# Patient Record
Sex: MALE | Race: WHITE | NOT HISPANIC OR LATINO | Employment: OTHER | URBAN - NONMETROPOLITAN AREA
[De-identification: names, ages, dates, MRNs, and addresses within clinical notes are randomized per-mention and may not be internally consistent; named-entity substitution may affect disease eponyms.]

---

## 2017-02-02 ENCOUNTER — OFFICE VISIT (OUTPATIENT)
Dept: UROLOGY | Facility: CLINIC | Age: 82
End: 2017-02-02

## 2017-02-02 VITALS
BODY MASS INDEX: 27.63 KG/M2 | WEIGHT: 193 LBS | HEIGHT: 70 IN | DIASTOLIC BLOOD PRESSURE: 80 MMHG | SYSTOLIC BLOOD PRESSURE: 134 MMHG | TEMPERATURE: 97.5 F

## 2017-02-02 DIAGNOSIS — C61 PROSTATE CANCER (HCC): Primary | ICD-10-CM

## 2017-02-02 PROCEDURE — 99214 OFFICE O/P EST MOD 30 MIN: CPT | Performed by: UROLOGY

## 2017-02-02 RX ORDER — CARVEDILOL 12.5 MG/1
12.5 TABLET ORAL 2 TIMES DAILY WITH MEALS
COMMUNITY

## 2017-02-02 RX ORDER — ASPIRIN 81 MG/1
81 TABLET ORAL DAILY
COMMUNITY

## 2017-02-02 NOTE — PROGRESS NOTES
Mr. Quinn is 81 y.o. male    CHIEF COMPLAINT: I have prostate cancer    HPI  Prostate cancer   He was initially diagnosed with prostate cancer about 2 year(s) ago. This was identified in the context of elevated psa.  Severity of the disease is best described as intermediate risk disease. Previous or current management includes active surveillance  including a first surveillance biopsy in July 2016 which showed cancer in 1 of 12 cores.  This was in the left apex at 10%.. Associated symptoms include nocturia, urgency and poor urinary stream. Currently his PSA is  11.95 ng/ml.     He had a CBC and a CMP which are essentially normal.  His PSA measures 11.95 ng/mL.  Previous biopsy in January 2015 showed Reading 3+4 disease in 1 of 12 cores and 20% of that core.  His prebiopsy PSA was 4.7  The following portions of the patient's history were reviewed and updated as appropriate: allergies, current medications, past family history, past medical history, past social history, past surgical history and problem list.    Review of Systems   Constitutional: Negative for chills and fever.   Gastrointestinal: Negative for abdominal pain, anal bleeding and blood in stool.   Genitourinary: Negative for flank pain and hematuria.         Current Outpatient Prescriptions:   •  aspirin 81 MG EC tablet, Take 81 mg by mouth Daily., Disp: , Rfl:   •  carvedilol (COREG) 12.5 MG tablet, Take 12.5 mg by mouth 2 (Two) Times a Day With Meals., Disp: , Rfl:     Past Medical History   Diagnosis Date   • Hypertension        Past Surgical History   Procedure Laterality Date   • Back surgery         Social History     Social History   • Marital status:      Spouse name: N/A   • Number of children: N/A   • Years of education: N/A     Social History Main Topics   • Smoking status: Never Smoker   • Smokeless tobacco: None   • Alcohol use None   • Drug use: None   • Sexual activity: Not Asked     Other Topics Concern   • None     Social History  "Narrative   • None       Family History   Problem Relation Age of Onset   • No Known Problems Father    • No Known Problems Mother        Visit Vitals   • /80   • Temp 97.5 °F (36.4 °C)   • Ht 70\" (177.8 cm)   • Wt 193 lb (87.5 kg)   • BMI 27.69 kg/m2       Physical Exam   Constitutional: He is oriented to person, place, and time. He appears well-developed and well-nourished.   Pulmonary/Chest: Effort normal.   Abdominal: Soft. He exhibits no distension and no mass. There is no tenderness. There is no rebound and no guarding. No hernia.   Genitourinary: Penis normal. Rectal exam shows no mass, no tenderness and anal tone normal. Enlarged: for the age of the patient. Right testis shows no mass, no swelling and no tenderness. Left testis shows no mass, no swelling and no tenderness. No hypospadias. No discharge found.   Genitourinary Comments:  The urethral meatus normal in position without evidence of stricture. Epididymis without mass or tenderness. Vas Otooiwm3r is palpably normal.Anus and perineum without mass or tenderness. The prostate is [id number] ml. It is Symmetric, with a Firm consistency. There are no nodules present. . The seminal vesicles are Palpably normal in size and without mass.     Neurological: He is alert and oriented to person, place, and time.   Vitals reviewed.        Results for orders placed or performed during the hospital encounter of 06/28/16   Converted Surgical Pathology   Result Value Ref Range    CONVERTED (HISTORICAL) CASE TYPE Surgical Pathology     CONVERTED (HISTORICAL) ACCESSION NUMBER [id number]     CONVERTED (HISTORICAL) RESULT STATUS FINAL     CONVERTED (HISTORICAL) SPECIMEN DESCRIPTION       Left lateral base&Left lateral mid&Left lateral apex&Left base&Left mid       Assessment and Plan  Diagnoses and all orders for this visit:    Prostate cancer  He is undergone a considerable increase in his PSA up to 11.95.  He is a proximally 6 months post initial surveillance " biopsy.  Reviewing the pathology he did have Moose 3+4 disease on his initial biopsy that was in the right lateral base.  That was negative on the surveillance biopsy.  However he had left apical disease at Moose 3+3.  He has multifocal disease and I am concerned about this increase in PSA could represent worsening of his prostate cancer.  I do think it warrants repeating the PSA although at this point we would have to say this indicates an uncertain prognosis with regard to whether or not the prostate cancer has worsened.  Until proven otherwise, we have to consider that it is.  I would recommend that we repeat a PSA in a few months to see if this just goes down on its own.  There are no symptoms or objective data for infection..     Guy Garcia MD  02/02/17  9:09 AM    EMR Dragon/Transcription disclaimer:  Much of this encounter note is an electronic transcription/translation of spoken language to printed text. The electronic translation of spoken language may permit erroneous, or at times, nonsensical words or phrases to be inadvertently transcribed; although I have reviewed the note for such errors, some may still exist.       Cc:KEVAN Mckoy

## 2017-05-03 ENCOUNTER — OFFICE VISIT (OUTPATIENT)
Dept: UROLOGY | Facility: CLINIC | Age: 82
End: 2017-05-03

## 2017-05-03 ENCOUNTER — RESULTS ENCOUNTER (OUTPATIENT)
Dept: UROLOGY | Facility: CLINIC | Age: 82
End: 2017-05-03

## 2017-05-03 VITALS
TEMPERATURE: 97.9 F | DIASTOLIC BLOOD PRESSURE: 92 MMHG | SYSTOLIC BLOOD PRESSURE: 142 MMHG | WEIGHT: 199 LBS | HEIGHT: 70 IN | BODY MASS INDEX: 28.49 KG/M2

## 2017-05-03 DIAGNOSIS — C61 PROSTATE CANCER (HCC): ICD-10-CM

## 2017-05-03 DIAGNOSIS — R35.1 NOCTURIA: ICD-10-CM

## 2017-05-03 DIAGNOSIS — C61 PROSTATE CANCER (HCC): Primary | ICD-10-CM

## 2017-05-03 LAB
BILIRUB BLD-MCNC: NEGATIVE MG/DL
CLARITY, POC: CLEAR
COLOR UR: YELLOW
GLUCOSE UR STRIP-MCNC: NEGATIVE MG/DL
KETONES UR QL: NEGATIVE
LEUKOCYTE EST, POC: NEGATIVE
NITRITE UR-MCNC: NEGATIVE MG/ML
PH UR: 6.5 [PH] (ref 5–8)
PROT UR STRIP-MCNC: NEGATIVE MG/DL
RBC # UR STRIP: NEGATIVE /UL
SP GR UR: 1.01 (ref 1–1.03)
UROBILINOGEN UR QL: NORMAL

## 2017-05-03 PROCEDURE — 81003 URINALYSIS AUTO W/O SCOPE: CPT | Performed by: UROLOGY

## 2017-05-03 PROCEDURE — 99214 OFFICE O/P EST MOD 30 MIN: CPT | Performed by: UROLOGY

## 2017-10-16 ENCOUNTER — RESULTS ENCOUNTER (OUTPATIENT)
Dept: UROLOGY | Facility: CLINIC | Age: 82
End: 2017-10-16

## 2017-10-16 DIAGNOSIS — C61 PROSTATE CANCER (HCC): ICD-10-CM

## 2018-12-05 ENCOUNTER — OFFICE VISIT (OUTPATIENT)
Dept: UROLOGY | Facility: CLINIC | Age: 83
End: 2018-12-05

## 2018-12-05 VITALS
BODY MASS INDEX: 27.72 KG/M2 | SYSTOLIC BLOOD PRESSURE: 152 MMHG | HEIGHT: 70 IN | DIASTOLIC BLOOD PRESSURE: 72 MMHG | TEMPERATURE: 97.7 F | WEIGHT: 193.6 LBS

## 2018-12-05 DIAGNOSIS — C61 PROSTATE CANCER (HCC): Primary | ICD-10-CM

## 2018-12-05 LAB
BILIRUB BLD-MCNC: NEGATIVE MG/DL
CLARITY, POC: CLEAR
COLOR UR: YELLOW
GLUCOSE UR STRIP-MCNC: NEGATIVE MG/DL
KETONES UR QL: NEGATIVE
LEUKOCYTE EST, POC: NEGATIVE
NITRITE UR-MCNC: NEGATIVE MG/ML
PH UR: 6 [PH] (ref 5–8)
PROT UR STRIP-MCNC: NEGATIVE MG/DL
RBC # UR STRIP: NEGATIVE /UL
SP GR UR: 1.02 (ref 1–1.03)
UROBILINOGEN UR QL: NORMAL

## 2018-12-05 PROCEDURE — 81003 URINALYSIS AUTO W/O SCOPE: CPT | Performed by: UROLOGY

## 2018-12-05 PROCEDURE — 99213 OFFICE O/P EST LOW 20 MIN: CPT | Performed by: UROLOGY

## 2018-12-05 NOTE — PATIENT INSTRUCTIONS

## 2019-05-24 ENCOUNTER — RESULTS ENCOUNTER (OUTPATIENT)
Dept: UROLOGY | Facility: CLINIC | Age: 84
End: 2019-05-24

## 2019-05-24 DIAGNOSIS — C61 PROSTATE CANCER (HCC): ICD-10-CM

## 2019-05-28 DIAGNOSIS — C61 PROSTATE CANCER (HCC): Primary | ICD-10-CM

## 2019-06-11 ENCOUNTER — RESULTS ENCOUNTER (OUTPATIENT)
Dept: UROLOGY | Facility: CLINIC | Age: 84
End: 2019-06-11

## 2019-06-11 DIAGNOSIS — C61 PROSTATE CANCER (HCC): ICD-10-CM

## 2019-07-10 NOTE — PROGRESS NOTES
Mr. Quinn is 83 y.o. male    CHIEF COMPLAINT: I am here for follow up on prostate cancer. My PSA is 13.0    HPI    Follow up Prostate cancer  Location: Prostate gland  Quality:  Adenocarcinoma  Severity: Intermediate risk disease  Duration: Diagnosed 01/2015  Context: This was identified when he had a biopsy to evaluate elevated PSA which was obtained as part of prostate cancer screening.  Modifying factors: He is on watchful waiting. Associated signs or symptoms: His prostate symptom score is 4 indicating mild severity. Quality of life assessment is rated as 1=pleased. He is most bothered by Urgency but is without hematuria or dysuria.    Patient denies any possible systemic symptoms of prostate cancer such as weight loss, lower extremity edema, or skeletal pain that could be worrisome for metastases.  History related to this issue:   - 01/2015: [facility]  Moose 3+4=7 in 20% of 1/12 cores.   Chose active surveillance.   - 06/2016: [facility]x   Clinical Diagnosis and History                           Pre-Operative Diagnosis:   R97.2.                                                                                   Final Diagnosis                           1.     Prostate, left lateral base, core biopsy:                                      Benign prostate tissue.                                                       2.     Prostate, left lateral mid, core biopsy:                                      Benign prostate tissue.                                                       3.     Prostate, left lateral apex, core biopsy:                                      Benign prostate tissue.                                                       4.     Prostate, left base, core biopsy:                                      Benign prostate tissue.                                                       5.     Prostate, left mid, core biopsy:                                      Benign prostate tissue.                                                        6.     Prostate, left apex, core biopsy:                                      Prostatic adenocarcinoma, Venango grade 3+3 equals 6 involving 10% of                           the submitted                                      core.                                      Negative for perineural invasion.                                       Tumor measures 1 mm in dimension.                              SYNOPTIC REPORT FOR PROSTATE CANCER NEEDLE BIOPSY                                                      Histologic type: Adenocarcinoma (acinar, not otherwise specified)                           Histologic Grade                           Moose Pattern                           Primary pattern: 3                           Secondary pattern: 3                           Total Venango score: 6                                                      Tumor quantitation                           Number cores positive: 1                           Total number cores: 12                           Percent of prostate tissue involved by tumor: 1%                           Total linear millimeters of carcinoma: 1                           Total linear millimeters of needle core tissue: 12                                                      Periprostatic invasion: Not identified                           Seminal vesicle invasion: Not identified                                                        7.     Prostate, right base, core biopsy:                                      Benign prostate tissue.                                                       8.     Prostate, right mid, core biopsy:                                      Benign prostate tissue.                                                       9.     Prostate, right lateral base, core biopsy:                                      Benign prostate tissue.                                                       10.     Prostate, right lateral mid, core  biopsy:                                      Benign prostate tissue.                                                       11.     Prostate, right apex, core biopsy:                                      Benign prostate tissue.                                                       12.     Prostate, right lateral apex, core biopsy:                                      Benign prostate tissue.         The following portions of the patient's history were reviewed and updated as appropriate: allergies, current medications, past family history, past medical history, past social history, past surgical history and problem list.      Review of Systems   Constitutional: Negative for chills and fever.   Gastrointestinal: Negative for abdominal pain, anal bleeding and blood in stool.   Genitourinary: Negative for decreased urine volume, difficulty urinating, discharge, dysuria, enuresis, flank pain, frequency, genital sores, hematuria, penile pain, penile swelling, scrotal swelling, testicular pain and urgency.         Current Outpatient Medications:   •  aspirin 81 MG EC tablet, Take 81 mg by mouth Daily., Disp: , Rfl:   •  carvedilol (COREG) 12.5 MG tablet, Take 12.5 mg by mouth 2 (Two) Times a Day With Meals., Disp: , Rfl:     Past Medical History:   Diagnosis Date   • Hypertension    • Prostate cancer (CMS/HCC) 01/2015    Intermiediate risk disease. See biopsy information under surgical hx       Past Surgical History:   Procedure Laterality Date   • BACK SURGERY     • PROSTATE ULTRASOUND BIOPSY  01/2015    darrell 3+3 in 10% in 20% of 1/12cores (left apex)   • PROSTATE ULTRASOUND BIOPSY  01/2015    Darrell 3+4 disease in 1 of 12 cores and 20% of that core.  His prebiopsy PSA was 4.7   • PROSTATE ULTRASOUND BIOPSY  01/2015       Social History     Socioeconomic History   • Marital status:      Spouse name: Not on file   • Number of children: Not on file   • Years of education: Not on file   • Highest education level:  "Not on file   Tobacco Use   • Smoking status: Never Smoker   • Smokeless tobacco: Never Used       Family History   Problem Relation Age of Onset   • No Known Problems Father    • No Known Problems Mother          Temp 97.1 °F (36.2 °C)   Ht 177.8 cm (70\")   Wt 88.5 kg (195 lb)   BMI 27.98 kg/m²       Physical Exam  Neuro: Alert and oriented ×3  Const: Not agitated or distressed; Vital signs are reviewed. No obvious deformities  Pulmonary: No respiratory distress  Skin: Without pallor or diaphoresis  GI: Abdomen is soft and nontender.  No significant distention.  No hernias noted.  : Penis and testicles are normal. Benign feeling prostate without nodule approximately 35 mL in size.       t  Data  Results for orders placed or performed in visit on 07/11/19   POC Urinalysis Dipstick, Multipro   Result Value Ref Range    Color Yellow Yellow, Straw, Dark Yellow, Roberta    Clarity, UA Clear Clear    Glucose, UA Negative Negative, 1000 mg/dL (3+) mg/dL    Bilirubin Negative Negative    Ketones, UA Negative Negative    Specific Gravity  1.010 1.005 - 1.030    Blood, UA Negative Negative    pH, Urine 7.0 5.0 - 8.0    Protein, POC Negative Negative mg/dL    Urobilinogen, UA Normal Normal    Nitrite, UA Negative Negative    Leukocytes Negative Negative         Imaging Results (last 7 days)     ** No results found for the last 168 hours. **        Patient's Body mass index is 27.98 kg/m². BMI is above normal parameters. Recommendations include: educational material.    International Prostate Symptom Score  The following is posted based on patient questionnaire answers:  0 - not at all    1-7 mild symptoms  1- Less than one time in five  8-19 moderate symptoms  2 -Less than half the time  20-35 severe symptoms  3 - About half the time  4 - More than half the time  5 - Almost always     For following sections:  Incomplete Emptying: - How often have you had the sensation  of not emptying your bladder completely after you " finished urinating?  0  Frequency: -How often have you had to urinate again less than   two hours after you finished urinating?      0  Intermittency: -How often have you found you stopped and started again  Several times when you urinate?       0  Urgency: -How often do you find it difficult to postpone urination?             3  Weak stream: - How often have you had a weak urinary stream?             0  Straining: - How often have you had to push or strain to begin  Urination?          0  Sleeping: -How many times did you most typically get up to urinate   From the time you went to bed at night until the time you got up in the   1  Morning          Total `  4    Quality of Life  How would you feel if you had to live with your urinary condition the way   0  It is now, no better, no worse for the rest of your life?    Where: 0=delighted; 1= pleased, 2= mostly satisfied, 3= mixed, 4 = mostly  Dissatisfied, 5= Unhappy, 6 = terrible      Assessment and Plan  Diagnoses and all orders for this visit:    Prostate cancer (CMS/[facility])  -     POC Urinalysis Dipstick, Multipro    Urgency of urination      -No clinical evidence of progression.  Continue this approach which is now more of a watchful waiting since we are not repeating any more surveillance biopsies.  PSA while elevated is very stable.  -His urinary urgency is present most of the time but is not bothersome to him.  He also attributes it to drinking quite a bit of water.      F/U: 6 months with a PSA before visit.       (Please note that portions of this note were completed with a voice recognition program.)  Guy Garcia MD  07/13/19  2:33 PM

## 2019-07-11 ENCOUNTER — OFFICE VISIT (OUTPATIENT)
Dept: UROLOGY | Facility: CLINIC | Age: 84
End: 2019-07-11

## 2019-07-11 VITALS — WEIGHT: 195 LBS | BODY MASS INDEX: 27.92 KG/M2 | HEIGHT: 70 IN | TEMPERATURE: 97.1 F

## 2019-07-11 DIAGNOSIS — C61 PROSTATE CANCER (HCC): Primary | ICD-10-CM

## 2019-07-11 DIAGNOSIS — R39.15 URGENCY OF URINATION: ICD-10-CM

## 2019-07-11 LAB
BILIRUB BLD-MCNC: NEGATIVE MG/DL
CLARITY, POC: CLEAR
COLOR UR: YELLOW
GLUCOSE UR STRIP-MCNC: NEGATIVE MG/DL
KETONES UR QL: NEGATIVE
LEUKOCYTE EST, POC: NEGATIVE
NITRITE UR-MCNC: NEGATIVE MG/ML
PH UR: 7 [PH] (ref 5–8)
PROT UR STRIP-MCNC: NEGATIVE MG/DL
RBC # UR STRIP: NEGATIVE /UL
SP GR UR: 1.01 (ref 1–1.03)
UROBILINOGEN UR QL: NORMAL

## 2019-07-11 PROCEDURE — 99213 OFFICE O/P EST LOW 20 MIN: CPT | Performed by: UROLOGY

## 2019-07-11 PROCEDURE — 81003 URINALYSIS AUTO W/O SCOPE: CPT | Performed by: UROLOGY

## 2019-07-11 NOTE — PATIENT INSTRUCTIONS

## 2020-01-03 DIAGNOSIS — C61 PROSTATE CANCER (HCC): Primary | ICD-10-CM

## 2020-01-16 DIAGNOSIS — C61 PROSTATE CANCER (HCC): ICD-10-CM

## 2020-01-20 NOTE — PROGRESS NOTES
Mr. Quinn is 84 y.o. male    CHIEF COMPLAINT: I am here for my 6 month follow up for prostate cancer. My PSA is 14.300.    HPI  Follow up Prostate cancer  A portion of the history of present illness from previous visit is copied and pasted but reviewed/updated as needed. .  This includes the location, severity of disease, quality, duration of onset, and treatment initiated. This is unchanged but part of this HPI. This is updated as need. His interval history is as follows:     He has been doing very well.  Weight is stable.  Voids with a good stream.  Says he would know he had prostate cancer if I did not tell him.      Location: Prostate gland  Quality:  Adenocarcinoma  Severity: Intermediate risk disease  Duration: Diagnosed 01/2015  Context: This was identified when he had a biopsy to evaluate elevated PSA which was obtained as part of prostate cancer screening.   Patient denies any possible systemic symptoms of prostate cancer such as weight loss, lower extremity edema, or skeletal pain that could be worrisome for metastases.  History related to this issue:   - 01/2015: TRUSBx  [place] 3+4=7 in 20% of 1/12 cores.   Chose active surveillance.   - 06/2016: [facility]x   Clinical Diagnosis and History                           Pre-Operative Diagnosis:   R97.2.                                                                                   Final Diagnosis                           1.     Prostate, left lateral base, core biopsy:                                      Benign prostate tissue.                                                       2.     Prostate, left lateral mid, core biopsy:                                      Benign prostate tissue.                                                       3.     Prostate, left lateral apex, core biopsy:                                      Benign prostate tissue.                                                       4.     Prostate, left base, core biopsy:                                       Benign prostate tissue.                                                       5.     Prostate, left mid, core biopsy:                                      Benign prostate tissue.                                                       6.     Prostate, left apex, core biopsy:                                      Prostatic adenocarcinoma, [place] grade 3+3 equals 6 involving 10% of                           the submitted                                      core.                                      Negative for perineural invasion.                                       Tumor measures 1 mm in dimension.                              SYNOPTIC REPORT FOR PROSTATE CANCER NEEDLE BIOPSY                                                      Histologic type: Adenocarcinoma (acinar, not otherwise specified)                           Histologic Grade                           [place]ose Pattern                           Primary pattern: 3                           Secondary pattern: 3                           Total [place] score: 6                                                      Tumor quantitation                           Number cores positive: 1                           Total number cores: 12                           Percent of prostate tissue involved by tumor: 1%                           Total linear millimeters of carcinoma: 1                           Total linear millimeters of needle core tissue: 12                                                      Periprostatic invasion: Not identified                           Seminal vesicle invasion: Not identified                                                        7.     Prostate, right base, core biopsy:                                      Benign prostate tissue.                                                       8.     Prostate, right mid, core biopsy:                                      Benign prostate tissue.                                                        9.     Prostate, right lateral base, core biopsy:                                      Benign prostate tissue.                                                       10.     Prostate, right lateral mid, core biopsy:                                      Benign prostate tissue.                                                       11.     Prostate, right apex, core biopsy:                                      Benign prostate tissue.                                                       12.     Prostate, right lateral apex, core biopsy:                                      Benign prostate tissue.           The following portions of the patient's history were reviewed and updated as appropriate: allergies, current medications, past family history, past medical history, past social history, past surgical history and problem list.      Review of Systems   Constitutional: Negative for chills and fever.   Gastrointestinal: Negative for abdominal pain, anal bleeding and blood in stool.   Genitourinary: Negative for dysuria and hematuria.         Current Outpatient Medications:   •  aspirin 81 MG EC tablet, Take 81 mg by mouth Daily., Disp: , Rfl:   •  carvedilol (COREG) 12.5 MG tablet, Take 12.5 mg by mouth 2 (Two) Times a Day With Meals., Disp: , Rfl:     Past Medical History:   Diagnosis Date   • Hypertension    • Prostate cancer (CMS/[facility]) 01/2015    Intermiediate risk disease. See biopsy information under surgical hx       Past Surgical History:   Procedure Laterality Date   • BACK SURGERY     • PROSTATE ULTRASOUND BIOPSY  01/2015    [place] 3+3 in 10% in 20% of 1/12cores (left apex)   • PROSTATE ULTRASOUND BIOPSY  01/2015    [place] 3+4 disease in 1 of 12 cores and 20% of that core.  His prebiopsy PSA was 4.7   • PROSTATE ULTRASOUND BIOPSY  01/2015       Social History     Socioeconomic History   • Marital status:      Spouse name: Not on file   • Number of  "children: Not on file   • Years of education: Not on file   • Highest education level: Not on file   Tobacco Use   • Smoking status: Never Smoker   • Smokeless tobacco: Never Used   Substance and Sexual Activity   • Drug use: Never   • Sexual activity: Defer       Family History   Problem Relation Age of Onset   • No Known Problems Father    • No Known Problems Mother          Temp 97.5 °F (36.4 °C)   Ht 177.8 cm (70\")   Wt 83.9 kg (185 lb)   BMI 26.54 kg/m²       Physical Exam  Constitutional:  They  appear well-developed and well-nourished. There are no obvious deformities. No distress. The vital signs are reviewed  Pulmonary/Chest: Effort normal.   GI: Soft. The patient exhibits no distension and no mass. There is no tenderness. There is no rebound and no guarding. No hernia.   Neurological: Patient is alert and oriented to person, place, and time.   Skin: Skin is warm and dry. Not diaphoretic.   Psychiatric:  normal mood and affect. Not agitated.         Data  Results for orders placed or performed in visit on 01/21/20   POC Urinalysis Dipstick, Multipro   Result Value Ref Range    Color Yellow Yellow, Straw, Dark Yellow, Roberta    Clarity, UA Clear Clear    Glucose, UA Negative Negative, 1000 mg/dL (3+) mg/dL    Bilirubin Negative Negative    Ketones, UA Trace (A) Negative    Specific Gravity  1.030 1.005 - 1.030    Blood, UA Trace (A) Negative    pH, Urine 5.5 5.0 - 8.0    Protein, POC Negative Negative mg/dL    Urobilinogen, UA Normal Normal    Nitrite, UA Negative Negative    Leukocytes Negative Negative     No results found for: PSA      Imaging Results (Last 7 Days)     ** No results found for the last 168 hours. **        International Prostate Symptom Score  The following is posted based on patient questionnaire answers:  0 - not at all    1-7 mild symptoms  1- Less than one time in five  8-19 moderate symptoms  2 -Less than half the time  20-35 severe symptoms  3 - About half the time  4 - More than " half the time  5 - Almost always     For following sections:  Incomplete Emptying: - How often have you had the sensation  of not emptying your bladder completely after you finished urinating?  0  Frequency: -How often have you had to urinate again less than   two hours after you finished urinating?      1  Intermittency: -How often have you found you stopped and started again  Several times when you urinate?       0  Urgency: -How often do you find it difficult to postpone urination?             3  Weak stream: - How often have you had a weak urinary stream?             0  Straining: - How often have you had to push or strain to begin  Urination?          0  Sleeping: -How many times did you most typically get up to urinate   From the time you went to bed at night until the time you got up in the   0  Morning          Total `  4    Quality of Life  How would you feel if you had to live with your urinary condition the way   0  It is now, no better, no worse for the rest of your life?    Where: 0=delighted; 1= pleased, 2= mostly satisfied, 3= mixed, 4 = mostly  Dissatisfied, 5= Unhappy, 6 = terrible      Assessment and Plan  Diagnoses and all orders for this visit:    Prostate cancer (CMS/[facility])  -     POC Urinalysis Dipstick, Multipro      Patient continues to do well and will be on watchful waiting.  We have therefore decided to not do any further biopsies unless something warrant today.    -Follow-up 6 months with PSA.  (Please note that portions of this note were completed with a voice recognition program.)  Guy Garcia MD  01/21/20  3:02 PM

## 2020-01-21 ENCOUNTER — OFFICE VISIT (OUTPATIENT)
Dept: UROLOGY | Facility: CLINIC | Age: 85
End: 2020-01-21

## 2020-01-21 VITALS — BODY MASS INDEX: 26.48 KG/M2 | WEIGHT: 185 LBS | TEMPERATURE: 97.5 F | HEIGHT: 70 IN

## 2020-01-21 DIAGNOSIS — C61 PROSTATE CANCER (HCC): Primary | ICD-10-CM

## 2020-01-21 LAB
BILIRUB BLD-MCNC: NEGATIVE MG/DL
CLARITY, POC: CLEAR
COLOR UR: YELLOW
GLUCOSE UR STRIP-MCNC: NEGATIVE MG/DL
KETONES UR QL: ABNORMAL
LEUKOCYTE EST, POC: NEGATIVE
NITRITE UR-MCNC: NEGATIVE MG/ML
PH UR: 5.5 [PH] (ref 5–8)
PROT UR STRIP-MCNC: NEGATIVE MG/DL
RBC # UR STRIP: ABNORMAL /UL
SP GR UR: 1.03 (ref 1–1.03)
UROBILINOGEN UR QL: NORMAL

## 2020-01-21 PROCEDURE — 99212 OFFICE O/P EST SF 10 MIN: CPT | Performed by: UROLOGY

## 2020-01-21 PROCEDURE — 81003 URINALYSIS AUTO W/O SCOPE: CPT | Performed by: UROLOGY

## 2021-09-23 ENCOUNTER — OFFICE VISIT (OUTPATIENT)
Dept: UROLOGY | Facility: CLINIC | Age: 86
End: 2021-09-23

## 2021-09-23 VITALS — BODY MASS INDEX: 27.26 KG/M2 | HEIGHT: 70 IN | TEMPERATURE: 98.1 F | WEIGHT: 190.4 LBS

## 2021-09-23 DIAGNOSIS — C61 PROSTATE CANCER (HCC): Primary | ICD-10-CM

## 2021-09-23 LAB
BILIRUB BLD-MCNC: NEGATIVE MG/DL
CLARITY, POC: CLEAR
COLOR UR: YELLOW
GLUCOSE UR STRIP-MCNC: NEGATIVE MG/DL
KETONES UR QL: NEGATIVE
LEUKOCYTE EST, POC: NEGATIVE
NITRITE UR-MCNC: NEGATIVE MG/ML
PH UR: 5 [PH] (ref 5–8)
PROT UR STRIP-MCNC: NEGATIVE MG/DL
RBC # UR STRIP: NEGATIVE /UL
SP GR UR: 1 (ref 1–1.03)
UROBILINOGEN UR QL: NORMAL

## 2021-09-23 PROCEDURE — 99214 OFFICE O/P EST MOD 30 MIN: CPT | Performed by: UROLOGY

## 2021-09-23 PROCEDURE — 81001 URINALYSIS AUTO W/SCOPE: CPT | Performed by: UROLOGY

## 2021-09-23 NOTE — PROGRESS NOTES
Chief Complaint  Prostate cancer    Subjective          Torin Quinn presents to [facility] for:  History of Present Illness  Patient adenocarcinoma the prostate diagnosed in January 2015.  A repeat biopsy in June 2016 to fulfill active surveillance criteria shows no evidence of progression.  Severity of this is considered intermediate risk disease based on the patient's [place] grade of the initial biopsy which was 3+4 = 7.  This was present only 1 of 12 cores.  Repeat biopsy in June 2016 also only showed one core present which had 10% of [place] 3+3 prostate cancer.  He returns today with markedly increasing PSA.Patient denies any possible systemic symptoms of prostate cancer such as weight loss, lower extremity edema, or skeletal pain that could be worrisome for metastases. He has experienced hematospermia several months ago.     History related to this issue:   - 01/2015: [facility]x  [place] 3+4=7 in 20% of 1/12 cores.   Chose active surveillance.   - 06/2016: [facility]USBx   Clinical Diagnosis and History                                                    Final Diagnosis                           1.     Prostate, left lateral base, core biopsy:                                      Benign prostate tissue.                                                       2.     Prostate, left lateral mid, core biopsy:                                      Benign prostate tissue.                                                       3.     Prostate, left lateral apex, core biopsy:                                      Benign prostate tissue.                                                       4.     Prostate, left base, core biopsy:                                      Benign prostate tissue.                                                       5.     Prostate, left mid, core biopsy:                                      Benign prostate tissue.                                                       6.      Prostate, left apex, core biopsy:                                      Prostatic adenocarcinoma, [place] grade 3+3 equals 6 involving 10% of                           the submitted                                      core.                                      Negative for perineural invasion.                                       Tumor measures 1 mm in dimension.                                        Histologic type: Adenocarcinoma (acinar, not otherwise specified)                           Histologic Grade                           [place] Pattern                           Primary pattern: 3                           Secondary pattern: 3                           Total [place] score: 6                                                      Tumor quantitation                           Number cores positive: 1                           Total number cores: 12                           Percent of prostate tissue involved by tumor: 1%                           Total linear millimeters of carcinoma: 1                           Total linear millimeters of needle core tissue: 12                                                      Periprostatic invasion: Not identified                           Seminal vesicle invasion: Not identified                                                        7.     Prostate, right base, core biopsy:                                      Benign prostate tissue.                                                       8.     Prostate, right mid, core biopsy:                                      Benign prostate tissue.                                                       9.     Prostate, right lateral base, core biopsy:                                      Benign prostate tissue.                                                       10.     Prostate, right lateral mid, core biopsy:                                      Benign prostate tissue.  "                                                      11.     Prostate, right apex, core biopsy:                                      Benign prostate tissue.                                                       12.     Prostate, right lateral apex, core biopsy:                                      Benign prostate tissue.          Current Outpatient Medications:   •  aspirin 81 MG EC tablet, Take 81 mg by mouth Daily., Disp: , Rfl:   •  carvedilol (COREG) 12.5 MG tablet, Take 12.5 mg by mouth 2 (Two) Times a Day With Meals., Disp: , Rfl:   Past Medical History:   Diagnosis Date   • Hypertension    • Prostate cancer (CMS/HCC) 01/2015    Intermiediate risk disease. See biopsy information under surgical hx     Past Surgical History:   Procedure Laterality Date   • BACK SURGERY     • PROSTATE ULTRASOUND BIOPSY  01/2015    [place] 3+3 in 10% in 20% of 1/12cores (left apex)   • PROSTATE ULTRASOUND BIOPSY  01/2015    [place] 3+4 disease in 1 of 12 cores and 20% of that core.  His prebiopsy PSA was 4.7   • PROSTATE ULTRASOUND BIOPSY  01/2015         Review  of systems  Constitutional: Negative for chills and fever.   Gastrointestinal: Negative for abdominal pain, anal bleeding and blood in stool.   Genitourinary: Negative for flank pain and hematuria.     Objective   PHYSICAL EXAM  Vital Signs:   Temp 98.1 °F (36.7 °C)   Ht 177.8 cm (70\")   Wt 86.4 kg (190 lb 6.4 oz)   BMI 27.32 kg/m²     Constitutional: Patient is without distress or deformity.  Vital signs are reviewed as above.    Neuro: No confusion; No disorientation; Alert and oriented  Pulmonary: No respiratory distress.   Skin: No pallor or diaphoresis   GI: Abdomen is soft and nontender.  No significant distention.  No hernias noted.  : Penis and testicles are normal. Benign feeling prostate without nodule approximately 35 mL in size.           DATA  Result Review :         Results for orders placed or performed in visit on 09/23/21   POC Urinalysis Dipstick, " Multipro    Specimen: Urine   Result Value Ref Range    Color Yellow Yellow, Straw, Dark Yellow, Roberta    Clarity, UA Clear Clear    Glucose, UA Negative Negative, 1000 mg/dL (3+) mg/dL    Bilirubin Negative Negative    Ketones, UA Negative Negative    Specific Gravity  1.005 1.005 - 1.030    Blood, UA Negative Negative    pH, Urine 5.0 5.0 - 8.0    Protein, POC Negative Negative mg/dL    Urobilinogen, UA Normal Normal    Nitrite, UA Negative Negative    Leukocytes Negative Negative       Date   PSA  9/17/20021  25.5  1/11/2020  14.3  6/14/2019  13.0  10/25/2018  13.27  04/19/2018  9.63  04/2017  9.35          ASSESSMENT AND PLAN      Problem List Items Addressed This Visit     None      Visit Diagnoses     Prostate cancer (CMS/[facility])    -  Primary    Relevant Orders    POC Urinalysis Dipstick, Multipro (Completed)    PSA DIAGNOSTIC      -Patient with intermediate risk prostate cancer that is 86 years old.  He is on watchful waiting.  He remains asymptomatic.  His PSA has increased considerably over the last 20 months.  -We discussed imaging with CT/bone scan to rule out metastatic disease.  We talked about a repeat biopsy to see if there is been progression histologically of the prostate cancer.  Lastly we discussed watchful waiting since he is asymptomatic.  He opted to have the PSA repeated in 3 months before considering imaging.        FOLLOW UP     Return in about 3 months (around 12/23/2021).        (Please note that portions of this note were completed with a voice recognition program.)  Guy Garcia MD  09/23/21  10:30 CDT

## 2021-12-02 ENCOUNTER — TELEPHONE (OUTPATIENT)
Dept: UROLOGY | Facility: CLINIC | Age: 86
End: 2021-12-02

## 2021-12-02 NOTE — TELEPHONE ENCOUNTER
Called mr spencer and asked if he had gotten his psa drawn he said no but he would try to get it done tomorrow. I told him we would need that before we see him in [place]. Pt confirmed.

## 2021-12-06 ENCOUNTER — OFFICE VISIT (OUTPATIENT)
Dept: UROLOGY | Facility: CLINIC | Age: 86
End: 2021-12-06

## 2021-12-06 VITALS — HEIGHT: 70 IN | BODY MASS INDEX: 26.92 KG/M2 | WEIGHT: 188 LBS | TEMPERATURE: 97.7 F

## 2021-12-06 DIAGNOSIS — C61 PROSTATE CANCER (HCC): Primary | ICD-10-CM

## 2021-12-06 DIAGNOSIS — N52.9 ERECTILE DYSFUNCTION, UNSPECIFIED ERECTILE DYSFUNCTION TYPE: ICD-10-CM

## 2021-12-06 PROCEDURE — 99213 OFFICE O/P EST LOW 20 MIN: CPT | Performed by: UROLOGY

## 2021-12-06 NOTE — PROGRESS NOTES
"Chief Complaint  Follow-up prostate cancer    Subjective          Torin Quinn presents to [facility] for follow-up of prostate cancer.  He was diagnosed 01/2015 by [facility]x which showed [place] 3+4=7 in 20% of 1/12 cores.  Initially chose active surveillance.  He had a repeat biopsyConverted Surgical Pathology (06/28/2016 10:06) showing [place] grade 3+3 = 6 in 1 of 12 cores.  Ultimately he chose to undergo watchful waiting.  His PSA is continued to increase but he remains asymptomatic.Patient denies any possible systemic symptoms of prostate cancer such as weight loss, lower extremity edema, or skeletal pain that could be worrisome for systemic disease.  From a voiding standpoint he is content with stream and his symptoms.    He uses 100 mg sildenafil for erections. He started this he says about 3 months ago. Pleased with results.    Current Outpatient Medications:   •  aspirin 81 MG EC tablet, Take 81 mg by mouth Daily., Disp: , Rfl:   •  carvedilol (COREG) 12.5 MG tablet, Take 12.5 mg by mouth 2 (Two) Times a Day With Meals., Disp: , Rfl:   Past Medical History:   Diagnosis Date   • Hypertension    • Prostate cancer (HCC) 01/2015    Intermiediate risk disease. See biopsy information under surgical hx     Past Surgical History:   Procedure Laterality Date   • BACK SURGERY     • PROSTATE ULTRASOUND BIOPSY  01/2015    [place] 3+3 in 10% in 20% of 1/12cores (left apex)   • PROSTATE ULTRASOUND BIOPSY  01/2015    [place] 3+4 disease in 1 of 12 cores and 20% of that core.  His prebiopsy PSA was 4.7   • PROSTATE ULTRASOUND BIOPSY  01/2015           Review  of systems  Constitutional: Negative for chills or fever.   Gastrointestinal: Negative for abdominal pain, anal bleeding or blood in stool.   Genitourinary: No flank pain or hematuria        Objective   PHYSICAL EXAM  Vital Signs:   Temp 97.7 °F (36.5 °C)   Ht 177.8 cm (70\")   Wt 85.3 kg (188 lb)   BMI 26.98 kg/m²     Constitutional: Patient " is without distress or deformity.  Vital signs are reviewed as above.    Neuro: No confusion; No disorientation; Alert and oriented  Pulmonary: No respiratory distress.   Skin: No pallor or diaphoresis      DATA  Result Review :              Results for orders placed or performed in visit on 09/23/21   POC Urinalysis Dipstick, Multipro    Specimen: Urine   Result Value Ref Range    Color Yellow Yellow, Straw, Dark Yellow, Roberta    Clarity, UA Clear Clear    Glucose, UA Negative Negative, 1000 mg/dL (3+) mg/dL    Bilirubin Negative Negative    Ketones, UA Negative Negative    Specific Gravity  1.005 1.005 - 1.030    Blood, UA Negative Negative    pH, Urine 5.0 5.0 - 8.0    Protein, POC Negative Negative mg/dL    Urobilinogen, UA Normal Normal    Nitrite, UA Negative Negative    Leukocytes Negative Negative     Date                             PSA  12/04/2021  25.6  9/17/20021                  25.5  1/11/2020                    14.3  6/14/2019                    13.0  10/25/2018                  13.27  04/19/2018                  9.63  04/2017                       9.35               ASSESSMENT AND PLAN          Problem List Items Addressed This Visit     None      Visit Diagnoses     Prostate cancer (HCC)    -  Primary    Erectile dysfunction, unspecified erectile dysfunction type          Although his PSA is markedly elevated, it is stable over the last 3 months.  He had his largest jump in the 20 months from January 2020 to September 2021(14.3-->25.5)    Agree with  Sildenafil.           FOLLOW UP     Return in about 6 months (around 6/6/2022).        (Please note that portions of this note were completed with a voice recognition program.)  Guy Garcia MD  12/06/21  14:51 CST

## 2022-06-10 ENCOUNTER — TELEPHONE (OUTPATIENT)
Dept: UROLOGY | Facility: CLINIC | Age: 87
End: 2022-06-10

## 2022-06-10 NOTE — TELEPHONE ENCOUNTER
Called pt to see if they had their psa done. Patients wife stated that mr spencer did have his psa done at [facility] on Wednesday.

## 2022-06-13 ENCOUNTER — OFFICE VISIT (OUTPATIENT)
Dept: UROLOGY | Facility: CLINIC | Age: 87
End: 2022-06-13

## 2022-06-13 VITALS — HEIGHT: 70 IN | WEIGHT: 178.6 LBS | TEMPERATURE: 97.9 F | BODY MASS INDEX: 25.57 KG/M2

## 2022-06-13 DIAGNOSIS — C61 PROSTATE CANCER: Primary | ICD-10-CM

## 2022-06-13 PROCEDURE — 99213 OFFICE O/P EST LOW 20 MIN: CPT | Performed by: UROLOGY

## 2022-06-13 RX ORDER — ERGOCALCIFEROL (VITAMIN D2) 10 MCG
400 TABLET ORAL DAILY
COMMUNITY

## 2022-06-13 RX ORDER — MULTIVIT WITH MINERALS/LUTEIN
250 TABLET ORAL DAILY
COMMUNITY

## 2022-06-13 RX ORDER — THIAMINE HCL 50 MG
50 TABLET ORAL DAILY
COMMUNITY

## 2022-12-08 DIAGNOSIS — C61 PROSTATE CANCER: ICD-10-CM

## 2023-01-03 NOTE — PROGRESS NOTES
Chief Complaint  F/u prostate cancer    Subjective          Torin Quinn presents to [facility] [place]   He has prostate cancer that that was GG3+3=6. He is on watchful waiting. He is complaining of left anterior lower rib cage pain. Had negative rib detail images and CT abdomen. That has improved. Patient denies any possible systemic symptoms of prostate cancer such as weight loss, lower extremity edema, or skeletal pain that could be worrisome for systemic disease.          Current Outpatient Medications:   •  aspirin 81 MG EC tablet, Take 81 mg by mouth Daily., Disp: , Rfl:   •  carvedilol (COREG) 12.5 MG tablet, Take 12.5 mg by mouth 2 (Two) Times a Day With Meals., Disp: , Rfl:   •  Thiamine HCl (vitamin B-1) 50 MG tablet, Take 50 mg by mouth Daily., Disp: , Rfl:   •  vitamin C (ASCORBIC ACID) 250 MG tablet, Take 250 mg by mouth Daily., Disp: , Rfl:   •  Vitamin D, Cholecalciferol, (CHOLECALCIFEROL) 10 MCG (400 UNIT) tablet, Take 400 Units by mouth Daily., Disp: , Rfl:   Past Medical History:   Diagnosis Date   • Hypertension    • Prostate cancer (HCC) 01/2015    Intermiediate risk disease. See biopsy information under surgical hx     Past Surgical History:   Procedure Laterality Date   • BACK SURGERY     • PROSTATE ULTRASOUND BIOPSY  01/2015    moose 3+3 in 10% in 20% of 1/12cores (left apex)   • PROSTATE ULTRASOUND BIOPSY  01/2015    Moose 3+4 disease in 1 of 12 cores and 20% of that core.  His prebiopsy PSA was 4.7   • PROSTATE ULTRASOUND BIOPSY  01/2015           Review of Systems  Review  of systems  Constitutional: Negative for chills and fever.   Gastrointestinal: Negative for abdominal pain, anal bleeding and blood in stool.   Genitourinary: Negative for flank pain and hematuria.      Objective   PHYSICAL EXAM  Vital Signs:   Temp 98.1 °F (36.7 °C)   Ht 177.8 cm (70\")   Wt 81.6 kg (180 lb)   BMI 25.83 kg/m²     Physical Exam  Constitutional: Patient is without distress or  deformity.  Vital signs are reviewed as above.    Neuro: No confusion; No disorientation; Alert and oriented  Pulmonary: No respiratory distress.   Skin: No pallor or diaphoresis.  The prostate is approximately 30 ml. It is Symmetric, with a Firm consistency. There are no nodules present. . The seminal vesicles are Palpably normal in size and without mass.        DATA  Result Review :              Results for orders placed or performed in visit on 01/10/23   POC Urinalysis Dipstick, Multipro    Specimen: Urine   Result Value Ref Range    Color Yellow Yellow, Straw, Dark Yellow, Roberta    Clarity, UA Clear Clear    Glucose, UA Negative Negative mg/dL    Bilirubin Negative Negative    Ketones, UA Negative Negative    Specific Gravity  1.030 1.005 - 1.030    Blood, UA Negative Negative    pH, Urine 5.5 5.0 - 8.0    Protein, POC Negative Negative mg/dL    Urobilinogen, UA Normal Normal, 0.2 E.U./dL    Nitrite, UA Negative Negative    Leukocytes Negative Negative     11/29/22 12/03/2021      Date                             PSA  11/29/2022  33.1  06/08/2022  25.2  12/04/2021                  25.6  9/17/20021                  25.5  1/11/2020                    14.3  6/14/2019                    13.0  10/25/2018                  13.27  04/19/2018                  9.63  04/2017                       9.35           ASSESSMENT AND PLAN          Problem List Items Addressed This Visit    None  Visit Diagnoses     Prostate cancer (HCC)    -  Primary    Relevant Orders    POC Urinalysis Dipstick, Multipro (Completed)    NM Bone Scan Whole Body      - PSA indicates concern for progression of prostate cancer. His left lower rib cage pain was negative radiographically   -Recommend CT abdomen/pelvis with contrast and Bone scan.  Had recent CT but I can't tell what they did based on their description.         FOLLOW UP     No follow-ups on file.        (Please note that portions of this note were completed with a voice  recognition program.)  Guy Garcia MD  01/10/23  13:43 CST

## 2023-01-10 ENCOUNTER — OFFICE VISIT (OUTPATIENT)
Dept: UROLOGY | Facility: CLINIC | Age: 88
End: 2023-01-10
Payer: MEDICARE

## 2023-01-10 ENCOUNTER — TELEPHONE (OUTPATIENT)
Dept: UROLOGY | Facility: CLINIC | Age: 88
End: 2023-01-10
Payer: MEDICARE

## 2023-01-10 VITALS — BODY MASS INDEX: 25.77 KG/M2 | WEIGHT: 180 LBS | TEMPERATURE: 98.1 F | HEIGHT: 70 IN

## 2023-01-10 DIAGNOSIS — C61 PROSTATE CANCER: Primary | ICD-10-CM

## 2023-01-10 LAB
BILIRUB BLD-MCNC: NEGATIVE MG/DL
CLARITY, POC: CLEAR
COLOR UR: YELLOW
GLUCOSE UR STRIP-MCNC: NEGATIVE MG/DL
KETONES UR QL: NEGATIVE
LEUKOCYTE EST, POC: NEGATIVE
NITRITE UR-MCNC: NEGATIVE MG/ML
PH UR: 5.5 [PH] (ref 5–8)
PROT UR STRIP-MCNC: NEGATIVE MG/DL
RBC # UR STRIP: NEGATIVE /UL
SP GR UR: 1.03 (ref 1–1.03)
UROBILINOGEN UR QL: NORMAL

## 2023-01-10 PROCEDURE — 99213 OFFICE O/P EST LOW 20 MIN: CPT | Performed by: UROLOGY

## 2023-01-10 PROCEDURE — 81001 URINALYSIS AUTO W/SCOPE: CPT | Performed by: UROLOGY

## 2023-01-10 NOTE — TELEPHONE ENCOUNTER
Per Dr. Garcia's office notes, patient will need to bring a copy of his CT done at [facility] done about six weeks ago.     He also wants to schedule a bone scan at [facility], as ordered.     The patient and his wife wanted to go home and look through their past imaging before making the appointment.  They agreed to bring/mail a copy of the disc to our office of the previous CT.  They will contact the office when they want to move forward with the NM bone scan.

## 2023-01-20 ENCOUNTER — TELEPHONE (OUTPATIENT)
Dept: UROLOGY | Facility: CLINIC | Age: 88
End: 2023-01-20
Payer: MEDICARE

## 2023-01-20 NOTE — TELEPHONE ENCOUNTER
Basically he had a CT of the abdomen with and without contrast but no pelvic CT.  Based on his elevated PSA I recommended a bone scan and think he still needs a CT of the abdomen and pelvis.  The other option would be to do an MRI of the pelvis which would add information as well.  They are leaning toward just a watchful waiting course.  I think that is very reasonable and agree that they would have made an informed decision.  I did try to touch base with them but I did leave a message.  Their last contact with us they said they would call me with a decision.  Electronically signed by Guy Garcia MD, 01/20/23, 4:27 PM CST.

## 2023-01-20 NOTE — TELEPHONE ENCOUNTER
----- Message from Guy Garcia MD sent at 1/10/2023  1:43 PM CST -----  Regarding: CT  What type CT did he have at [facility]

## 2023-08-17 ENCOUNTER — TELEPHONE (OUTPATIENT)
Dept: NEUROSURGERY | Age: 88
End: 2023-08-17

## 2023-08-17 NOTE — TELEPHONE ENCOUNTER
DOES NOT WANT APPT     [facility] New Patient Questionnaire    Diagnosis/Reason for Referral?    Radiculopathy, lumbar region    2. Who is completing questionnaire? Patient  Caregiver Family      3. Has the patient had any previous spinal/brain surgeries? YES BACK 35 YEARS AGO      A. If yes, what is the name of the facility in which the surgery was performed? B. Procedure/Surgery performed? C. Who was the surgeon? D. When was the surgery? MM/YY       E. Did the patient improve after the surgery? 4. Is this a second opinion? If yes, Dr. Yohannes Fraga would like to review patient first before making the appointment. 5. Have MRI Images been obtain within the last year? Yes  No      XR  CT     If yes, where was the imaging performed? [facility]    If yes, what part of the body? Lumbar  Cervical  Thoracic  Brain     If yes, when was it obtained? TWO WEEKS AGO        Note: if the scan was performed at a facility other than [facility], the disc will need to be brought to the appointment or we need to reach out to obtain the disc. A. Was the patient instructed to provide the disc? Yes   No      8. Has the patient had a NCV/EMG within the last year? Yes  No     If yes, where was it performed and date? MM/YY  Location:      9. Has the patient been to Physical Therapy? Yes  No     If yes, what location, how long attended, and last visit? Location:        Therapy Lasted:    Date of Last Visit:      10. Has the patient been to Pain Management? Yes  No     If yes, what location and last visit     Location:   Last Visit:   Is it helping?

## 2023-10-03 ENCOUNTER — TELEPHONE (OUTPATIENT)
Dept: NEUROLOGY | Age: 88
End: 2023-10-03

## 2024-01-12 ENCOUNTER — TELEPHONE (OUTPATIENT)
Dept: UROLOGY | Facility: CLINIC | Age: 89
End: 2024-01-12
Payer: MEDICARE

## 2024-01-24 NOTE — PROGRESS NOTES
"Chief Complaint  Prostate Cancer    Subjective          Torin Quinn presents to [facility]   Here for prostate cancer.  On watchful waiting.  Patient denies any possible systemic symptoms of prostate cancer such as weight loss, lower extremity edema, or skeletal pain that could be worrisome for systemic disease.  LUTS are stable. Not bothersome.       Current Outpatient Medications:     aspirin 81 MG EC tablet, Take 1 tablet by mouth Daily., Disp: , Rfl:     carvedilol (COREG) 12.5 MG tablet, Take 1 tablet by mouth 2 (Two) Times a Day With Meals., Disp: , Rfl:     sildenafil (VIAGRA) 50 MG tablet, TAKE ONE TABLET BY MOUTH 30 MINUTES BEFORE DESIRED EFFECT. 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 See biopsy information under surgical hx     Past Surgical History:   Procedure Laterality Date    BACK SURGERY      PROSTATE ULTRASOUND BIOPSY  01/2015    moose 3+3 in 10% in 20% of 1/12cores (left apex)    PROSTATE ULTRASOUND BIOPSY  01/2015    Moose 3+4 disease in 1 of 12 cores and 20% of that core.  His prebiopsy PSA was 4.7    PROSTATE ULTRASOUND BIOPSY  01/2015           Review of Systems      Objective   PHYSICAL EXAM  Vital Signs:   Temp 96.3 °F (35.7 °C)   Ht 177.8 cm (70\")   Wt 84.2 kg (185 lb 9.6 oz)   BMI 26.63 kg/m²     Physical Exam      DATA  Result Review :              Results for orders placed or performed in visit on 01/30/24   POC Urinalysis " Dipstick, Multipro    Specimen: Urine   Result Value Ref Range    Color Yellow Yellow, Straw, Dark Yellow, Roberta    Clarity, UA Clear Clear    Glucose, UA Negative Negative mg/dL    Bilirubin Negative Negative    Ketones, UA Negative Negative    Specific Gravity  1.010 1.005 - 1.030    Blood, UA Negative Negative    pH, Urine 6.0 5.0 - 8.0    Protein, POC Negative Negative mg/dL    Urobilinogen, UA 0.2 E.U./dL Normal, 0.2 E.U./dL    Nitrite, UA Negative Negative    Leukocytes Negative Negative           Date                             PSA  05/31/2023                  35.5  11/29/2022                  33.1  06/08/2022                  25.2  12/04/2021                  25.6  9/17/20021                  25.5  1/11/2020                    14.3  6/14/2019                    13.0  10/25/2018                  13.27  04/19/2018                  9.63  04/2017                       9.35             ASSESSMENT AND PLAN          Problem List Items Addressed This Visit    None  Visit Diagnoses       Prostate cancer    -  Primary    Relevant Orders    POC Urinalysis Dipstick, Multipro (Completed)        Patient feels well  Concerned about side effects of ADT +/- androgen synthesis inhibitors.   These are unlikely to increase his life expectancy or improve quality of life given his age.         FOLLOW UP     No follow-ups on file.        (Please note that portions of this note were completed with a voice recognition program.)  Guy Garcia MD  01/30/24  14:01 CST

## 2024-01-26 DIAGNOSIS — C61 PROSTATE CANCER: ICD-10-CM

## 2024-01-30 ENCOUNTER — OFFICE VISIT (OUTPATIENT)
Dept: UROLOGY | Facility: CLINIC | Age: 89
End: 2024-01-30
Payer: MEDICARE

## 2024-01-30 VITALS — HEIGHT: 70 IN | BODY MASS INDEX: 26.57 KG/M2 | WEIGHT: 185.6 LBS | TEMPERATURE: 96.3 F

## 2024-01-30 DIAGNOSIS — C61 PROSTATE CANCER: Primary | ICD-10-CM

## 2024-01-30 LAB
BILIRUB BLD-MCNC: NEGATIVE MG/DL
CLARITY, POC: CLEAR
COLOR UR: YELLOW
GLUCOSE UR STRIP-MCNC: NEGATIVE MG/DL
KETONES UR QL: NEGATIVE
LEUKOCYTE EST, POC: NEGATIVE
NITRITE UR-MCNC: NEGATIVE MG/ML
PH UR: 6 [PH] (ref 5–8)
PROT UR STRIP-MCNC: NEGATIVE MG/DL
RBC # UR STRIP: NEGATIVE /UL
SP GR UR: 1.01 (ref 1–1.03)
UROBILINOGEN UR QL: NORMAL

## 2024-01-30 PROCEDURE — 1160F RVW MEDS BY RX/DR IN RCRD: CPT | Performed by: UROLOGY

## 2024-01-30 PROCEDURE — 81003 URINALYSIS AUTO W/O SCOPE: CPT | Performed by: UROLOGY

## 2024-01-30 PROCEDURE — 99213 OFFICE O/P EST LOW 20 MIN: CPT | Performed by: UROLOGY

## 2024-01-30 PROCEDURE — 1159F MED LIST DOCD IN RCRD: CPT | Performed by: UROLOGY

## 2024-07-22 NOTE — PROGRESS NOTES
Chief Complaint  Prostate cancer    Subjective          Torin Quinn presents to [facility]   He returns today for follow-up of adenocarcinoma of the prostate.  He is on watchful waiting based on his age and comorbidities.  He initially presented with elevated PSA.  Cancer was diagnosed  01/2015 by [facility] which showed Moose 3+4=7 in 20% of 1/12 cores.  Initially chose active surveillance.  He had a repeat biopsyConverted Surgical Pathology (06/28/2016 10:06) showing Hardy grade 3+3 = 6 in 1 of 12 cores   Patient denies any possible systemic symptoms of prostate cancer such as weight loss, lower extremity edema, or skeletal pain that could be worrisome for systemic disease.            Current Outpatient Medications:     aspirin 81 MG EC tablet, Take 1 tablet by mouth Daily., Disp: , Rfl:     carvedilol (COREG) 12.5 MG tablet, Take 1 tablet by mouth 2 (Two) Times a Day With Meals., Disp: , Rfl:     sildenafil (VIAGRA) 50 MG tablet, TAKE ONE TABLET BY MOUTH 30 MINUTES BEFORE DESIRED EFFECT. 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 See biopsy information under surgical hx     Past Surgical History:   Procedure Laterality Date    BACK SURGERY      PROSTATE ULTRASOUND BIOPSY  01/2015    moose 3+3 in 10% in 20% of 1/12cores (left apex)    PROSTATE ULTRASOUND BIOPSY  01/2015    Moose 3+4 disease in 1 of 12 cores and 20% of that core.  His prebiopsy PSA was 4.7  "   PROSTATE ULTRASOUND BIOPSY  01/2015           Review of Systems      Objective   PHYSICAL EXAM  Vital Signs:   Temp 96.6 °F (35.9 °C)   Ht 177.8 cm (70\")   Wt 85.8 kg (189 lb 3.2 oz)   BMI 27.15 kg/m²     Physical Exam      DATA  Result Review :              Results for orders placed or performed in visit on 07/30/24   POC Urinalysis Dipstick, Multipro    Specimen: Urine   Result Value Ref Range    Color Yellow Yellow, Straw, Dark Yellow, Roberta    Clarity, UA Clear Clear    Glucose, UA Negative Negative mg/dL    Bilirubin Negative Negative    Ketones, UA Negative Negative    Specific Gravity  1.020 1.005 - 1.030    Blood, UA Trace (A) Negative    pH, Urine 5.5 5.0 - 8.0    Protein, POC Negative Negative mg/dL    Urobilinogen, UA 0.2 E.U./dL Normal, 0.2 E.U./dL    Nitrite, UA Negative Negative    Leukocytes Negative Negative                  Date                             PSA  07/30/2024  39.4  05/31/2023                  35.5  11/29/2022                  33.1  06/08/2022                  25.2  12/04/2021                  25.6  9/17/20021                  25.5  1/11/2020                    14.3  6/14/2019                    13.0  10/25/2018                  13.27  04/19/2018                  9.63  04/2017                       9.35       ASSESSMENT AND PLAN          Problem List Items Addressed This Visit    None  Visit Diagnoses       Prostate cancer    -  Primary    Relevant Orders    POC Urinalysis Dipstick, Multipro (Completed)    PSA Diagnostic (Completed)        His PSA is stable.  He remains asymptomatic.  He is a good candidate to continue watchful waiting.          FOLLOW UP     Return in about 1 year (around 7/30/2025) for PSA before visit.        (Please note that portions of this note were completed with a voice recognition program.)  Guy Garcia MD  07/30/24  13:43 CDT  "

## 2024-07-24 ENCOUNTER — TELEPHONE (OUTPATIENT)
Dept: UROLOGY | Facility: CLINIC | Age: 89
End: 2024-07-24
Payer: MEDICARE

## 2024-07-24 NOTE — TELEPHONE ENCOUNTER
Called Patient to remind them to get PSA prior to appointment.  Left vm with Patient.  If patient calls back it is ok for the HUB to tell the pt the message.    
No complaints

## 2024-07-26 DIAGNOSIS — C61 PROSTATE CANCER: ICD-10-CM

## 2024-07-30 ENCOUNTER — OFFICE VISIT (OUTPATIENT)
Dept: UROLOGY | Facility: CLINIC | Age: 89
End: 2024-07-30
Payer: MEDICARE

## 2024-07-30 VITALS — TEMPERATURE: 96.6 F | BODY MASS INDEX: 27.09 KG/M2 | WEIGHT: 189.2 LBS | HEIGHT: 70 IN

## 2024-07-30 DIAGNOSIS — C61 PROSTATE CANCER: Primary | ICD-10-CM

## 2024-07-30 LAB
BILIRUB BLD-MCNC: NEGATIVE MG/DL
CLARITY, POC: CLEAR
COLOR UR: YELLOW
GLUCOSE UR STRIP-MCNC: NEGATIVE MG/DL
KETONES UR QL: NEGATIVE
LEUKOCYTE EST, POC: NEGATIVE
NITRITE UR-MCNC: NEGATIVE MG/ML
PH UR: 5.5 [PH] (ref 5–8)
PROT UR STRIP-MCNC: NEGATIVE MG/DL
RBC # UR STRIP: ABNORMAL /UL
SP GR UR: 1.02 (ref 1–1.03)
UROBILINOGEN UR QL: ABNORMAL

## 2024-07-30 PROCEDURE — 1159F MED LIST DOCD IN RCRD: CPT | Performed by: UROLOGY

## 2024-07-30 PROCEDURE — 99213 OFFICE O/P EST LOW 20 MIN: CPT | Performed by: UROLOGY

## 2024-07-30 PROCEDURE — 1160F RVW MEDS BY RX/DR IN RCRD: CPT | Performed by: UROLOGY

## 2024-07-30 PROCEDURE — 81001 URINALYSIS AUTO W/SCOPE: CPT | Performed by: UROLOGY

## 2024-10-23 RX ORDER — ASPIRIN 81 MG/1
81 TABLET ORAL DAILY
COMMUNITY

## 2024-10-23 RX ORDER — CARVEDILOL 12.5 MG/1
12.5 TABLET ORAL 2 TIMES DAILY WITH MEALS
COMMUNITY

## 2024-10-23 RX ORDER — CYCLOBENZAPRINE HCL 10 MG
10 TABLET ORAL 3 TIMES DAILY PRN
COMMUNITY

## 2024-10-23 RX ORDER — THIAMINE HCL 50 MG
50 TABLET ORAL DAILY
COMMUNITY

## 2024-10-23 RX ORDER — POLYETHYLENE GLYCOL 3350 17 G/17G
17 POWDER, FOR SOLUTION ORAL DAILY
COMMUNITY

## 2024-10-23 RX ORDER — ERGOCALCIFEROL (VITAMIN D2) 10 MCG
400 TABLET ORAL DAILY
COMMUNITY

## 2024-10-23 RX ORDER — SILDENAFIL 50 MG/1
50 TABLET, FILM COATED ORAL PRN
COMMUNITY

## 2024-10-23 RX ORDER — LISINOPRIL 10 MG/1
10 TABLET ORAL DAILY
COMMUNITY

## 2024-10-23 RX ORDER — DONEPEZIL HYDROCHLORIDE 5 MG/1
5 TABLET, FILM COATED ORAL NIGHTLY
COMMUNITY

## 2025-05-27 DIAGNOSIS — C61 PROSTATE CANCER: Primary | ICD-10-CM

## 2025-07-21 ENCOUNTER — TELEPHONE (OUTPATIENT)
Dept: UROLOGY | Facility: CLINIC | Age: 89
End: 2025-07-21
Payer: MEDICARE

## 2025-07-30 ENCOUNTER — TELEPHONE (OUTPATIENT)
Dept: UROLOGY | Facility: CLINIC | Age: 89
End: 2025-07-30
Payer: MEDICARE

## 2025-07-30 DIAGNOSIS — C61 PROSTATE CANCER: Primary | ICD-10-CM

## 2025-07-30 NOTE — TELEPHONE ENCOUNTER
Called pt back to let him know that Dr. Garcia recommends a PET scan based on his most recent PSA results. Spoke with pt's wife. Told her Dr. Garcia said he was comfortable canceling tomorrow's appt to allow time for the PET scan before he sees the pt again.    She asked if we could fax the PET scan order over to [facility]. I told her I could and advised that she should call us to schedule the follow up once the PET scan is scheduled. She expressed v/u and had no further questions at this time.

## 2025-07-30 NOTE — TELEPHONE ENCOUNTER
Patient's wife has called and asked if we had received his PSA levels back, she would like to know if it is necessary for them to come to the appointment or if we can push the appointment out, she feels if the numbers haven't moved  to much if this could be possible. Please advise

## 2025-07-30 NOTE — TELEPHONE ENCOUNTER
Called pt's wife to let her know that [facility] cannot perform a PSMA PET scan so that will need to be done here. Told her that I will reach out to the front to schedule that as well as the follow up appt. She expressed v/u.

## 2025-08-13 ENCOUNTER — TELEPHONE (OUTPATIENT)
Dept: UROLOGY | Facility: CLINIC | Age: 89
End: 2025-08-13
Payer: MEDICARE

## 2025-08-29 ENCOUNTER — HOSPITAL ENCOUNTER (OUTPATIENT)
Dept: CT IMAGING | Facility: HOSPITAL | Age: 89
Discharge: HOME | End: 2025-08-29
Payer: MEDICARE

## 2025-08-29 DIAGNOSIS — C61 PROSTATE CANCER: ICD-10-CM

## 2025-08-29 PROCEDURE — 34310000005 FLOTUFOLASTAT F-18 296-5846 MBQ/ML SOLUTION: Performed by: UROLOGY

## 2025-08-29 PROCEDURE — A9608 FLOTUFOLASTAT F-18 296-5846 MBQ/ML SOLUTION: HCPCS | Performed by: UROLOGY

## 2025-08-29 PROCEDURE — 78815 PET IMAGE W/CT SKULL-THIGH: CPT

## 2025-08-29 RX ADMIN — FLOTUFOLASTAT F-18 1 DOSE: 158 INJECTION INTRAVENOUS at 13:12
